# Patient Record
Sex: MALE | Race: WHITE | URBAN - METROPOLITAN AREA
[De-identification: names, ages, dates, MRNs, and addresses within clinical notes are randomized per-mention and may not be internally consistent; named-entity substitution may affect disease eponyms.]

---

## 2018-11-20 ENCOUNTER — OFFICE VISIT (OUTPATIENT)
Dept: FAMILY MEDICINE | Facility: CLINIC | Age: 58
End: 2018-11-20
Payer: COMMERCIAL

## 2018-11-20 VITALS
TEMPERATURE: 98.1 F | WEIGHT: 176 LBS | HEART RATE: 62 BPM | DIASTOLIC BLOOD PRESSURE: 78 MMHG | OXYGEN SATURATION: 98 % | RESPIRATION RATE: 18 BRPM | HEIGHT: 71 IN | SYSTOLIC BLOOD PRESSURE: 132 MMHG | BODY MASS INDEX: 24.64 KG/M2

## 2018-11-20 DIAGNOSIS — Z01.818 PREOP TESTING: Primary | ICD-10-CM

## 2018-11-20 PROCEDURE — 99244 OFF/OP CNSLTJ NEW/EST MOD 40: CPT | Performed by: NURSE PRACTITIONER

## 2018-11-20 PROCEDURE — 93000 ELECTROCARDIOGRAM COMPLETE: CPT | Performed by: NURSE PRACTITIONER

## 2018-11-20 RX ORDER — OMEPRAZOLE 10 MG/1
CAPSULE, DELAYED RELEASE ORAL
Refills: 2 | COMMUNITY
Start: 2018-09-12

## 2018-11-20 RX ORDER — WARFARIN SODIUM 5 MG/1
5 TABLET ORAL
COMMUNITY
Start: 2013-12-09 | End: 2018-11-20

## 2018-11-20 RX ORDER — IDELALISIB 150 MG/1
TABLET, FILM COATED ORAL 2 TIMES DAILY
COMMUNITY
Start: 2018-11-12

## 2018-11-20 RX ORDER — WARFARIN SODIUM 5 MG/1
TABLET ORAL
Refills: 3 | COMMUNITY
Start: 2018-11-12

## 2018-11-20 RX ORDER — OXYCODONE AND ACETAMINOPHEN 5; 325 MG/1; MG/1
TABLET ORAL
COMMUNITY
Start: 2006-12-18 | End: 2019-05-28

## 2018-11-20 RX ORDER — ACYCLOVIR 200 MG/1
CAPSULE ORAL
Refills: 3 | COMMUNITY
Start: 2018-11-12

## 2018-11-20 ASSESSMENT — ENCOUNTER SYMPTOMS
DIFFICULTY URINATING: 0
CONSTIPATION: 0
VOMITING: 0
HEADACHES: 0
CHILLS: 0
NAUSEA: 0
SHORTNESS OF BREATH: 0
FEVER: 0
WHEEZING: 0
DIARRHEA: 0
ABDOMINAL PAIN: 0
COUGH: 0
FATIGUE: 0
FLANK PAIN: 0

## 2018-11-20 NOTE — PATIENT INSTRUCTIONS
Preop clearance, notching in lead 3 and AVF, possible emerging LBBB. EKG similar from 2013, low suspicion of acute change.     Recent labs are reassuring. Pt is average risk for sedation and is cleared to proceed to surgery.

## 2018-11-20 NOTE — PROGRESS NOTES
[address] [address]  [phone]         Subjective:   Maurice Knight is a 58 y.o. male who presents today with   Chief Complaint   Patient presents with   • Pre-op Exam     Pt scheduled for lithotripsy on 11/27/18 at [facility]      He is referred by Dr. Dai for a preoperative physical examination and medical clearance for surgery. He is scheduled to have lithotripsy at [facility] on 11/27/2018    History of Present Illness:   Recurrent kidney stones, uretral stent placement and removal, Factor V Leiden, CLL in remission, DVT    Anesthesia History:   He has not had an adverse reaction to anesthesia in the past. Family members who have had adverse reactions to anesthesia include: has not.   Steriod History:   He has not used steriods in the past 6 months.   Recent infection exposure history:   He has not been exposed to a sick person recently.     Vitals:  Vitals:    11/20/18 0939   BP: 132/78   Pulse: 62   Resp: 18   Temp: 36.7 °C (98.1 °F)   SpO2: 98%         There is no immunization history on file for this patient.   Past Medical History:   Diagnosis Date   • Clotting disorder (CMS/HCC) (HCC)       Past Surgical History:   Procedure Laterality Date   • FOOT SURGERY  2002   • GALLBLADDER SURGERY  2008   • IVC FILTER RETRIEVAL  2008   • KIDNEY STONE SURGERY  04/2017   • KNEE SURGERY  04/2012   • TEMPORAL ARTERY BIOPSY / LIGATION  02/2008      Social History   Substance Use Topics   • Smoking status: Never Smoker   • Smokeless tobacco: Never Used   • Alcohol use 1.2 oz/week     1 Cans of beer, 1 Glasses of wine per week      Comment: Daily      Family History   Problem Relation Age of Onset   • COPD Father    • Heart disease Father    • Breast cancer Sister    • Prostate cancer Brother       No Known Allergies   [unfilled]   Review of Systems   Constitutional: Negative for chills, fatigue and fever.   Respiratory: Negative for  "cough, shortness of breath and wheezing.    Cardiovascular: Negative for chest pain.   Gastrointestinal: Negative for abdominal pain, constipation, diarrhea, nausea and vomiting.   Genitourinary: Negative for difficulty urinating and flank pain.   Neurological: Negative for headaches.      Vitals:    11/20/18 0939   BP: 132/78   BP Location: Left upper arm   Patient Position: Sitting   Pulse: 62   Resp: 18   Temp: 36.7 °C (98.1 °F)   TempSrc: Oral   SpO2: 98%   Weight: 79.8 kg (176 lb)   Height: 1.803 m (5' 11\")      Body mass index is 24.55 kg/m². [unfilled]       Objective:   Physical Exam   Constitutional: He is oriented to person, place, and time. He appears well-developed and well-nourished.   HENT:   Head: Normocephalic and atraumatic.   Right Ear: Hearing and tympanic membrane normal.   Left Ear: Hearing and tympanic membrane normal.   Nose: Nose normal.   Mouth/Throat: Uvula is midline and oropharynx is clear and moist.   Cardiovascular: Normal rate, regular rhythm and normal heart sounds.    Pulmonary/Chest: Effort normal and breath sounds normal.   Neurological: He is alert and oriented to person, place, and time.   Psychiatric: He has a normal mood and affect. His speech is normal and behavior is normal. Judgment and thought content normal. Cognition and memory are normal.      Assessment & Plan:     Preop clearance, notching in lead 3 and AVF, possible emerging LBBB. EKG similar from 2013, low suspicion of acute change.     Recent labs are reassuring. Pt is average risk for sedation and is cleared to proceed to surgery.     NANO Villagomez 11/20/2018       "

## 2019-05-28 ENCOUNTER — OFFICE VISIT (OUTPATIENT)
Dept: FAMILY MEDICINE | Facility: CLINIC | Age: 59
End: 2019-05-28
Payer: COMMERCIAL

## 2019-05-28 VITALS
HEIGHT: 71 IN | TEMPERATURE: 97.7 F | OXYGEN SATURATION: 97 % | WEIGHT: 178 LBS | SYSTOLIC BLOOD PRESSURE: 126 MMHG | BODY MASS INDEX: 24.92 KG/M2 | RESPIRATION RATE: 16 BRPM | HEART RATE: 70 BPM | DIASTOLIC BLOOD PRESSURE: 82 MMHG

## 2019-05-28 DIAGNOSIS — K21.9 GASTROESOPHAGEAL REFLUX DISEASE, ESOPHAGITIS PRESENCE NOT SPECIFIED: ICD-10-CM

## 2019-05-28 DIAGNOSIS — C95.11 CHRONIC LEUKEMIA IN REMISSION (CMS/HCC): Primary | ICD-10-CM

## 2019-05-28 DIAGNOSIS — N20.1 URETERIC STONE: ICD-10-CM

## 2019-05-28 PROCEDURE — 99213 OFFICE O/P EST LOW 20 MIN: CPT | Performed by: FAMILY MEDICINE

## 2022-07-08 ENCOUNTER — NEW PATIENT (OUTPATIENT)
Dept: URBAN - METROPOLITAN AREA CLINIC 17 | Facility: CLINIC | Age: 62
End: 2022-07-08

## 2022-07-08 DIAGNOSIS — H25.813: ICD-10-CM

## 2022-07-08 DIAGNOSIS — H43.813: ICD-10-CM

## 2022-07-08 DIAGNOSIS — H33.322: ICD-10-CM

## 2022-07-08 PROCEDURE — 99204 OFFICE O/P NEW MOD 45 MIN: CPT

## 2022-07-08 PROCEDURE — 92201 OPSCPY EXTND RTA DRAW UNI/BI: CPT

## 2022-07-08 ASSESSMENT — KERATOMETRY
OS_AXISANGLE_DEGREES: 54
OD_K2POWER_DIOPTERS: 43.00
OD_AXISANGLE2_DEGREES: 38
OS_AXISANGLE2_DEGREES: 144
OD_K1POWER_DIOPTERS: 42.00
OS_K1POWER_DIOPTERS: 41.75
OS_K2POWER_DIOPTERS: 43.50
OD_AXISANGLE_DEGREES: 128

## 2022-07-08 ASSESSMENT — VISUAL ACUITY
OD_CC: J1
OS_SC: 20/20-1
OS_CC: J1
OD_SC: 20/25+1

## 2022-07-08 ASSESSMENT — TONOMETRY
OS_IOP_MMHG: 20
OD_IOP_MMHG: 21

## 2024-02-12 ENCOUNTER — RX ONLY (RX ONLY)
Age: 64
End: 2024-02-12

## 2024-08-15 ENCOUNTER — RX ONLY (RX ONLY)
Age: 64
End: 2024-08-15

## 2024-12-02 ENCOUNTER — APPOINTMENT (OUTPATIENT)
Dept: URBAN - METROPOLITAN AREA CLINIC 95 | Facility: CLINIC | Age: 64
Setting detail: DERMATOLOGY
End: 2024-12-02

## 2024-12-02 DIAGNOSIS — D18.0 HEMANGIOMA: ICD-10-CM

## 2024-12-02 DIAGNOSIS — L82.1 OTHER SEBORRHEIC KERATOSIS: ICD-10-CM

## 2024-12-02 DIAGNOSIS — Z71.89 OTHER SPECIFIED COUNSELING: ICD-10-CM

## 2024-12-02 DIAGNOSIS — L20.89 OTHER ATOPIC DERMATITIS: ICD-10-CM | Status: WORSENING

## 2024-12-02 DIAGNOSIS — D22 MELANOCYTIC NEVI: ICD-10-CM

## 2024-12-02 DIAGNOSIS — L81.4 OTHER MELANIN HYPERPIGMENTATION: ICD-10-CM

## 2024-12-02 DIAGNOSIS — B07.8 OTHER VIRAL WARTS: ICD-10-CM

## 2024-12-02 PROBLEM — D23.72 OTHER BENIGN NEOPLASM OF SKIN OF LEFT LOWER LIMB, INCLUDING HIP: Status: ACTIVE | Noted: 2024-12-02

## 2024-12-02 PROBLEM — D22.5 MELANOCYTIC NEVI OF TRUNK: Status: ACTIVE | Noted: 2024-12-02

## 2024-12-02 PROBLEM — D22.61 MELANOCYTIC NEVI OF RIGHT UPPER LIMB, INCLUDING SHOULDER: Status: ACTIVE | Noted: 2024-12-02

## 2024-12-02 PROBLEM — D22.62 MELANOCYTIC NEVI OF LEFT UPPER LIMB, INCLUDING SHOULDER: Status: ACTIVE | Noted: 2024-12-02

## 2024-12-02 PROBLEM — D22.72 MELANOCYTIC NEVI OF LEFT LOWER LIMB, INCLUDING HIP: Status: ACTIVE | Noted: 2024-12-02

## 2024-12-02 PROBLEM — D22.71 MELANOCYTIC NEVI OF RIGHT LOWER LIMB, INCLUDING HIP: Status: ACTIVE | Noted: 2024-12-02

## 2024-12-02 PROBLEM — D18.01 HEMANGIOMA OF SKIN AND SUBCUTANEOUS TISSUE: Status: ACTIVE | Noted: 2024-12-02

## 2024-12-02 PROCEDURE — ? SUNSCREEN RECOMMENDATIONS

## 2024-12-02 PROCEDURE — ? PRESCRIPTION

## 2024-12-02 PROCEDURE — ? COUNSELING

## 2024-12-02 PROCEDURE — 99204 OFFICE O/P NEW MOD 45 MIN: CPT | Mod: 25

## 2024-12-02 PROCEDURE — ? LIQUID NITROGEN

## 2024-12-02 PROCEDURE — 17110 DESTRUCTION B9 LES UP TO 14: CPT

## 2024-12-02 RX ORDER — CLOBETASOL PROPIONATE 0.5 MG/G
CREAM TOPICAL BID
Qty: 60 | Refills: 2 | Status: ERX | COMMUNITY
Start: 2024-12-02

## 2024-12-02 RX ADMIN — CLOBETASOL PROPIONATE: 0.5 CREAM TOPICAL at 00:00

## 2024-12-02 ASSESSMENT — LOCATION DETAILED DESCRIPTION DERM
LOCATION DETAILED: LEFT DISTAL PRETIBIAL REGION
LOCATION DETAILED: LEFT DISTAL POSTERIOR THIGH
LOCATION DETAILED: RIGHT ANTERIOR DISTAL THIGH
LOCATION DETAILED: PERIUMBILICAL SKIN
LOCATION DETAILED: RIGHT MID-UPPER BACK
LOCATION DETAILED: LEFT ANTERIOR DISTAL UPPER ARM
LOCATION DETAILED: LEFT DISTAL POSTERIOR UPPER ARM
LOCATION DETAILED: RIGHT ANTERIOR DISTAL UPPER ARM
LOCATION DETAILED: RIGHT DISTAL PRETIBIAL REGION
LOCATION DETAILED: RIGHT PROXIMAL CALF
LOCATION DETAILED: LEFT LATERAL SUPERIOR CHEST
LOCATION DETAILED: RIGHT LATERAL SUPERIOR CHEST
LOCATION DETAILED: LEFT POSTERIOR SHOULDER
LOCATION DETAILED: LEFT SUPERIOR LATERAL LOWER BACK
LOCATION DETAILED: LEFT ANTERIOR PROXIMAL THIGH
LOCATION DETAILED: RIGHT PROXIMAL DORSAL FOREARM
LOCATION DETAILED: RIGHT SUPERIOR UPPER BACK
LOCATION DETAILED: EPIGASTRIC SKIN
LOCATION DETAILED: RIGHT RADIAL PALM

## 2024-12-02 ASSESSMENT — LOCATION SIMPLE DESCRIPTION DERM
LOCATION SIMPLE: LEFT POSTERIOR THIGH
LOCATION SIMPLE: LEFT LOWER BACK
LOCATION SIMPLE: RIGHT UPPER BACK
LOCATION SIMPLE: RIGHT HAND
LOCATION SIMPLE: RIGHT CALF
LOCATION SIMPLE: RIGHT FOREARM
LOCATION SIMPLE: LEFT PRETIBIAL REGION
LOCATION SIMPLE: ABDOMEN
LOCATION SIMPLE: RIGHT UPPER ARM
LOCATION SIMPLE: LEFT SHOULDER
LOCATION SIMPLE: CHEST
LOCATION SIMPLE: LEFT UPPER ARM
LOCATION SIMPLE: RIGHT PRETIBIAL REGION
LOCATION SIMPLE: LEFT THIGH
LOCATION SIMPLE: RIGHT THIGH

## 2024-12-02 ASSESSMENT — LOCATION ZONE DERM
LOCATION ZONE: LEG
LOCATION ZONE: HAND
LOCATION ZONE: TRUNK
LOCATION ZONE: ARM